# Patient Record
Sex: MALE | Race: WHITE | Employment: FULL TIME | URBAN - METROPOLITAN AREA
[De-identification: names, ages, dates, MRNs, and addresses within clinical notes are randomized per-mention and may not be internally consistent; named-entity substitution may affect disease eponyms.]

---

## 2019-02-27 ENCOUNTER — TELEPHONE (OUTPATIENT)
Dept: GASTROENTEROLOGY | Age: 34
End: 2019-02-27

## 2019-03-01 ENCOUNTER — OFFICE VISIT (OUTPATIENT)
Dept: GASTROENTEROLOGY | Age: 34
End: 2019-03-01
Payer: COMMERCIAL

## 2019-03-01 VITALS
BODY MASS INDEX: 24.55 KG/M2 | DIASTOLIC BLOOD PRESSURE: 84 MMHG | HEART RATE: 76 BPM | WEIGHT: 176 LBS | SYSTOLIC BLOOD PRESSURE: 140 MMHG

## 2019-03-01 DIAGNOSIS — K62.5 BRIGHT RED RECTAL BLEEDING: Primary | ICD-10-CM

## 2019-03-01 PROCEDURE — 99244 OFF/OP CNSLTJ NEW/EST MOD 40: CPT | Performed by: INTERNAL MEDICINE

## 2019-03-01 RX ORDER — ACETAMINOPHEN 325 MG/1
650 TABLET ORAL PRN
COMMUNITY

## 2019-03-01 ASSESSMENT — ENCOUNTER SYMPTOMS
SORE THROAT: 0
RECTAL PAIN: 0
TROUBLE SWALLOWING: 0
CONSTIPATION: 1
CHOKING: 0
SINUS PRESSURE: 0
ABDOMINAL PAIN: 0
BACK PAIN: 0
VOMITING: 0
HEMATOCHEZIA: 1
BLOOD IN STOOL: 1
DIARRHEA: 1
NAUSEA: 0
WHEEZING: 0
ABDOMINAL DISTENTION: 0
ANAL BLEEDING: 0
COUGH: 0
VOICE CHANGE: 0

## 2019-05-13 ENCOUNTER — HOSPITAL ENCOUNTER (OUTPATIENT)
Facility: CLINIC | Age: 34
Discharge: HOME | End: 2019-05-15
Payer: COMMERCIAL

## 2019-05-13 ENCOUNTER — HOSPITAL ENCOUNTER (OUTPATIENT)
Dept: GENERAL RADIOLOGY | Facility: CLINIC | Age: 34
Discharge: HOME | End: 2019-05-15
Payer: COMMERCIAL

## 2019-05-13 DIAGNOSIS — M25.572 TOE JOINT PAIN, LEFT: ICD-10-CM

## 2019-05-13 PROCEDURE — 73630 X-RAY EXAM OF FOOT: CPT

## 2019-05-15 ENCOUNTER — OFFICE VISIT (OUTPATIENT)
Dept: ORTHOPEDIC SURGERY | Age: 34
End: 2019-05-15
Payer: COMMERCIAL

## 2019-05-15 VITALS — BODY MASS INDEX: 24.32 KG/M2 | HEIGHT: 71 IN | WEIGHT: 173.72 LBS

## 2019-05-15 DIAGNOSIS — S92.912A: Primary | ICD-10-CM

## 2019-05-15 PROCEDURE — 99243 OFF/OP CNSLTJ NEW/EST LOW 30: CPT | Performed by: ORTHOPAEDIC SURGERY

## 2019-05-15 ASSESSMENT — ENCOUNTER SYMPTOMS
DIARRHEA: 0
COUGH: 0
NAUSEA: 0
CONSTIPATION: 0

## 2019-05-15 NOTE — PROGRESS NOTES
[facility]  [id number] [place] [address]  Dept: [phone]    Ambulatory Orthopedic Consult      CHIEF COMPLAINT:    Chief Complaint   Patient presents with    Foot Injury     displaced fracture of distal phalanx of toe, left foot       HISTORY OF PRESENT ILLNESS:      The patient is a 36 y.o. male who is being seen at the request of  Igor Hernandez PA-C for consultation and evaluation of  Left 4th distal phalanx fracture. The patient states on 5/11/19, he was playing with his kids and he kicked a dog bone. He states that he felt a snap and had immediate pain to the left small toe. The patient states that he went to his PCP on  5/13/19 an exam and x-ray was done. The patient states he fitted for a fracture shoe and crutches and then referred here. Patient states that he has been off of work since the injury. The patient has also tried over the counter oral NSAIDs, icing and elvating. He states that he does not have much pain now. The patient states that his PCP took him off of work until 5/27/19. Past Medical History:    History reviewed. No pertinent past medical history. Past Surgical History:    History reviewed. No pertinent surgical history. Current Medications:   Current Outpatient Medications   Medication Sig Dispense Refill    cephALEXin (KEFLEX) 500 MG capsule Take 500 mg by mouth 4 times daily      Elastic Bandages & Supports (POST-OP SHOE/SOFT TOP MEN) MISC Apply 1 fracture boot to left foot for 2 weeks, when walking 1 each 0    Misc. Devices ([facility]) MISC 1 Units by Does not apply route continuous Please fit patient for appropriate size & instruct patient on how to use these safely.     DX: left toe fracture 2 each 0    acetaminophen (TYLENOL) 325 MG tablet Take 650 mg by mouth as needed for Pain      traMADol (ULTRAM) 50 MG tablet Take 1 tablet by mouth every 8 hours as needed for Pain for up to 5 days. 15 tablet 0     No current facility-administered medications for this visit. Allergies:    Patient has no known allergies. Social History:   Social History     Socioeconomic History    Marital status:      Spouse name: Not on file    Number of children: Not on file    Years of education: Not on file    Highest education level: Not on file   Occupational History    Not on file   Social Needs    Financial resource strain: Not on file    Food insecurity:     Worry: Not on file     Inability: Not on file    Transportation needs:     Medical: Not on file     Non-medical: Not on file   Tobacco Use    Smoking status: Current Every Day Smoker     Types: Cigarettes    Smokeless tobacco: Never Used   Substance and Sexual Activity    Alcohol use: Yes     Comment: moderate    Drug use: No    Sexual activity: Yes     Partners: Female   Lifestyle    Physical activity:     Days per week: Not on file     Minutes per session: Not on file    Stress: Not on file   Relationships    Social connections:     Talks on phone: Not on file     Gets together: Not on file     Attends Yazdanism service: Not on file     Active member of club or organization: Not on file     Attends meetings of clubs or organizations: Not on file     Relationship status: Not on file    Intimate partner violence:     Fear of current or ex partner: Not on file     Emotionally abused: Not on file     Physically abused: Not on file     Forced sexual activity: Not on file   Other Topics Concern    Not on file   Social History Narrative    Not on file       Family History:  Family History   Problem Relation Age of Onset    Cancer Maternal Grandfather          REVIEW OF SYSTEMS:  Review of Systems   Constitutional: Negative for chills and fever. Respiratory: Negative for cough. Gastrointestinal: Negative for constipation, diarrhea and nausea. Musculoskeletal: Positive for arthralgias (left 4th toe).  Negative for gait problem, joint swelling and myalgias. Neurological: Negative for dizziness, weakness and numbness. I have reviewed the CC, HPI, ROS, PMH, FHX, Social History, and if not present in this note, I have reviewed in the patient's chart. I agree with the documentation provided by other staff and have reviewed their documentation prior to providing my signature indicating agreement. PHYSICAL EXAM:  Ht 5' 10.98\" (1.803 m)   Wt 173 lb 11.6 oz (78.8 kg)   BMI 24.24 kg/m²  Body mass index is 24.24 kg/m². Physical Exam  Gen: alert and oriented to person and place. Psych:  Appropriate affect; Appropriate knowledge base; Appropriate mood; No hallucinations; Head: normocephalic, atraumatic   Chest: symmetric chest excursion; nonlabored respiratory effort. Pelvis: stable; no obvious pelvis deformity  Ortho Exam  Extremity: On evaluation swelling and bruising of the 4th left toe is noted. Tenderness over the PIP joint is appreciated. No tenderness over the remaining phalanges or metatarsals is noted. No tenderness over the midfoot is noted. No breaks in the skin are noted. Motor, sensory, vascular examination to the left lower extremity is otherwise grossly intact without focal deficits. Patient's full range of motion of the left ankle. Radiology:   Xr Foot Left (min 3 Views)    Result Date: 5/13/2019  EXAMINATION: 3 XRAY VIEWS OF THE LEFT FOOT 5/13/2019 12:09 pm COMPARISON: None. HISTORY: ORDERING SYSTEM PROVIDED HISTORY: Toe joint pain, left TECHNOLOGIST PROVIDED HISTORY: left 4th and 5th digiti swelling, injury Saturday Ordering Physician Provided Reason for Exam: left 4th and 5th toe pain bruising , injury 2 days Acuity: Acute Type of Exam: Initial Mechanism of Injury: pt stated kicked a dog bone , pain bruising and swelling 4th and 5 th toes FINDINGS: There is an acute, comminuted and displaced intra-articular fracture involving the head of the 4th proximal phalanx. No acute dislocation. Soft tissue swelling of the 4th toe. Tarsometatarsal alignment is maintained. Acute, comminuted and displaced intra-articular fracture involving the head of the 4th proximal phalanx. No acute dislocation. ASSESSMENT:     1. Closed fracture of distal phalanx of toe of left foot         PLAN:     Reviewed pervious x-rays with the patient. Discussed etiology and natural history of 4th toe distal phalanx fracture. The treatment options may include oral anti-inflammatories, bracing, injections, advanced imaging, activity modification, physical therapy and/or surgical intervention. The patient would like to proceed with buddy taping the toe and wearing a fracture shoe as needed. Also dicussed with the patient that he may have a deformity but functionally he will do well. Patient notes understanding. The patient will follow up in 2 weeks with x-rays. He is to remain off of work until 5/27/19 or when the patient is re-evaluated. We discussed that the patient should call us with any concerns or questions. Return in about 2 weeks (around 5/29/2019) for x rays. No orders of the defined types were placed in this encounter. No orders of the defined types were placed in this encounter. Selwyn Neves MA am scribing for and in the presence of Dr. Mis Dunaway  5/15/2019 8:15 AM    I have reviewed and made changes accordingly to the work scribed by Clarisse Rodriguez MA. The documentation accurately reflects work and decisions made by me. I have also reviewed documentation completed by clinical staff.     Mis Dunaway DO, [facility]  5/15/2019 4:56 PM    This note is created with the assistance of a speech recognition program.  While intending to generate a document that actually reflects the content of the visit, the document can still have some errors including those of syntax and sound a like substitutions which may escape proof reading.  In such instances, actual meaning can be extrapolated by contextual diversion      Electronically signed by Vevermarguerite Santana DO, FAOAO on 5/15/2019 at 4:55 PM

## 2019-05-15 NOTE — LETTER
Dr. Becerra Pitch  [address] [facility] [address]  Dept: [phone]  Dept Fax: [phone]        5/15/19    Patient: Therese Harrison  YOB: 1985    Dear Charlene Hernandez PA-C,    I had the pleasure of seeing one of your patients, Aline Collins today in the office. Below are the relevant portions of my assessment and plan of care. IMPRESSION:     1. Closed fracture of distal phalanx of toe of left foot      PLAN:     Reviewed pervious x-rays with the patient. Discussed etiology and natural history of 4th toe distal phalanx fracture. The treatment options may include oral anti-inflammatories, bracing, injections, advanced imaging, activity modification, physical therapy and/or surgical intervention. The patient would like to proceed with buddy taping the toe and wearing a fracture shoe as needed. Also dicussed with the patient that he may have a deformity but functionally he will do well. Patient notes understanding. The patient will follow up in 2 weeks with x-rays. He is to remain off of work until 5/27/19 or when the patient is re-evaluated. We discussed that the patient should call us with any concerns or questions. Thank you for allowing me to participate in the care of this patient. I will keep you updated on this patient's follow up and I look forward to serving you and your patients again in the future. Please don't hesitate to contact me at my mobile number [phone].         Gentry Flores

## 2019-05-28 DIAGNOSIS — S92.912A: Primary | ICD-10-CM

## 2019-05-29 ENCOUNTER — OFFICE VISIT (OUTPATIENT)
Dept: ORTHOPEDIC SURGERY | Age: 34
End: 2019-05-29
Payer: COMMERCIAL

## 2019-05-29 VITALS — WEIGHT: 173.72 LBS | BODY MASS INDEX: 24.32 KG/M2 | HEIGHT: 71 IN

## 2019-05-29 DIAGNOSIS — S92.912A: Primary | ICD-10-CM

## 2019-05-29 PROCEDURE — 99213 OFFICE O/P EST LOW 20 MIN: CPT | Performed by: ORTHOPAEDIC SURGERY

## 2019-05-29 ASSESSMENT — ENCOUNTER SYMPTOMS
CONSTIPATION: 0
DIARRHEA: 0
COUGH: 0
NAUSEA: 0

## 2019-05-29 NOTE — LETTER
[facility]  [address] [place] [id number]  Phone: [phone]  Fax: Cj Denise,         May 29, 2019     Patient: Lilia Johnson   YOB: 1985   Date of Visit: 5/29/2019       To Whom it May Concern:    Lilia Johnson was seen in my clinic on 5/29/2019. He may return to work on 5/30/19. If you have any questions or concerns, please don't hesitate to call.     Sincerely,           Natasha Cannon DO

## 2019-05-29 NOTE — PROGRESS NOTES
[address] [place] [address] [facility]  [address] [place] [address].  Dept: [phone]  Dept Fax: [phone]        Ambulatory Follow Up      Subjective:   Trent Ravi is a 36y.o. year old male who presents to our office today for routine followup regarding his   1. Closed fracture of distal phalanx of toe of left foot    . Chief Complaint   Patient presents with    Foot Injury     displaced fracture of distal phalanx of toe, left foot DOI 5/11/19       HPI  Trent Ravi  is a 36 y.o.  male who presents today in follow for 4th toe displaced fracture of the distal phalax. The patient was last seen on 5/15/2019 and underwent treatment in the form of wearing the fracture shoe and cedric taping the toe. The patient notes some improvement with the previous treatment. The patient has remained off of work at this time. His pain is slowly improving. Review of Systems   Constitutional: Negative for chills and fever. Respiratory: Negative for cough. Gastrointestinal: Negative for constipation, diarrhea and nausea. Musculoskeletal: Positive for arthralgias (left 4th toe). Negative for gait problem, joint swelling and myalgias. Neurological: Negative for dizziness, weakness and numbness. I have reviewed the CC, HPI, ROS, PMH, FHX, Social History, and if not present in this note, I have reviewed in the patient's chart. I agree with the documentation provided by other staff and have reviewed their documentation prior to providing my signature indicating agreement. Objective :   Ht 5' 10.98\" (1.803 m)   Wt 173 lb 11.6 oz (78.8 kg)   BMI 24.24 kg/m²  Body mass index is 24.24 kg/m². General: Trent Ravi is a 36 y.o. male who is alert and oriented and sitting comfortably in our office. Ortho Exam  MS:  On evaluation of the left foot, swelling of the 4th left toe is noted.   Tenderness over the fourth toe proximal phalanx is appreciated. No tenderness over the remaining phalanges or metatarsals is noted. The patient has cedric taping in place with no pad between the third and fourth toes. Motor, sensory, vascular examination left lower extremity is grossly intact without focal deficits. Neuro: alert and oriented to person and place. Eyes: Extra-ocular muscles intact  Mouth: Oral mucosa moist. No perioral lesions  Pulm: Respirations unlabored and regular. Symmetric chest excursion without outward deformity is noted. Skin: warm, well perfused  Psych:   Patient has good fund of knowledge and displays understanging of exam, diagnosis, and plan. Radiology:     Xr Foot Left (min 3 Views)    Result Date: 5/29/2019  History: Left fourth toe proximal phalanx fracture Findings: AP, lateral, oblique x-rays of the left foot done the office today shows a bicondylar fracture of the distal aspect of the proximal phalanx of the left fourth toe with shortening and displacement. No further evidence of fracture, subluxation, dislocation, radiopaque foreign body, repeat tumors noted. Impression: Left fourth toe proximal phalanx fracture as described above. Xr Foot Left (min 3 Views)    Result Date: 5/15/2019  EXAMINATION: 3 XRAY VIEWS OF THE LEFT FOOT 5/13/2019 12:09 pm COMPARISON: None. HISTORY: ORDERING SYSTEM PROVIDED HISTORY: Toe joint pain, left TECHNOLOGIST PROVIDED HISTORY: left 4th and 5th digiti swelling, injury Saturday Ordering Physician Provided Reason for Exam: left 4th and 5th toe pain bruising , injury 2 days Acuity: Acute Type of Exam: Initial Mechanism of Injury: pt stated kicked a dog bone , pain bruising and swelling 4th and 5 th toes FINDINGS: There is an acute, comminuted and displaced intra-articular fracture involving the head of the 4th proximal phalanx. No acute dislocation. Soft tissue swelling of the 4th toe. Tarsometatarsal alignment is maintained.      Acute, comminuted and displaced intra-articular fracture involving the head of the 4th proximal phalanx. No acute dislocation. Assessment:      1. Closed fracture of distal phalanx of toe of left foot       Plan:      Reviewed today's radiologies with the patient. Discussed with the patient in regards to treating non operatively. However in the future if that joint became arthrtic, he may have to undergo a fusion. Patient is to continue with cedric taping. He was instructed on cedric taping with a gauze and coban. Patient noted understanding. He may transition into normal shoe when ready. Patient may also return to work when ready. Patient states he would like to try a half day tomorrow, a note was provided. Patient will follow up 4 weeks with x-rays. Follow up:Return in about 1 month (around 6/26/2019). No orders of the defined types were placed in this encounter. Orders Placed This Encounter   Procedures    XR FOOT LEFT (MIN 3 VIEWS)     Standing Status:   Future     Standing Expiration Date:   5/29/2020     I, Nishant Dale MA am scribing for and in the presence of Dr. Yesica Ennis  5/29/2019 8:20 AM    I have reviewed and made changes accordingly to the work scribed by Nishant Dale MA. The documentation accurately reflects work and decisions made by me. I have also reviewed documentation completed by clinical staff.     Yesica Ennis DO, [facility]  5/29/2019 8:42 PM    This note is created with the assistance of a speech recognition program.  While intending to generate a document that actually reflects the content of the visit, the document can still have some errors including those of syntax and sound a like substitutions which may escape proof reading.  In such instances, actual meaning can be extrapolated by contextual diversion      Electronically signed by Brunilda Cordon on 5/29/2019 at 8:41 PM

## 2019-06-20 ENCOUNTER — TELEPHONE (OUTPATIENT)
Dept: GASTROENTEROLOGY | Age: 34
End: 2019-06-20

## 2021-06-29 ENCOUNTER — NURSE TRIAGE (OUTPATIENT)
Dept: OTHER | Facility: CLINIC | Age: 36
End: 2021-06-29

## 2021-06-29 NOTE — TELEPHONE ENCOUNTER
Reason for Disposition   Message left with person in household    Protocols used: NO CONTACT OR DUPLICATE CONTACT CALL-ADULT-[place]    Received call from [address] at [facility] with 10X10 Room. Brief description of triage: bump on lip with lip swelling and chin swelling    Triage indicates for patient to patient at work and cannot complete triage. Advised wife to have him call back for triage or if compromises breathing, edema worsens to seek immediate care. Care advice provided, patient verbalizes understanding; denies any other questions or concerns; instructed to call back for any new or worsening symptoms. Writer provided warm transfer to [facility] at [facility] for appointment scheduling. Attention Provider: Thank you for allowing me to participate in the care of your patient. The patient was connected to triage in response to information provided to the ECC. Please do not respond through this encounter as the response is not directed to a shared pool.